# Patient Record
Sex: FEMALE | Race: BLACK OR AFRICAN AMERICAN | NOT HISPANIC OR LATINO | Employment: UNEMPLOYED | URBAN - METROPOLITAN AREA
[De-identification: names, ages, dates, MRNs, and addresses within clinical notes are randomized per-mention and may not be internally consistent; named-entity substitution may affect disease eponyms.]

---

## 2022-03-28 ENCOUNTER — HISTORICAL (OUTPATIENT)
Dept: ADMINISTRATIVE | Facility: HOSPITAL | Age: 47
End: 2022-03-28

## 2022-12-28 ENCOUNTER — HOSPITAL ENCOUNTER (EMERGENCY)
Facility: HOSPITAL | Age: 47
Discharge: HOME | End: 2022-12-28
Attending: EMERGENCY MEDICINE
Payer: MEDICAID

## 2022-12-28 VITALS
BODY MASS INDEX: 29.02 KG/M2 | SYSTOLIC BLOOD PRESSURE: 132 MMHG | HEIGHT: 64 IN | OXYGEN SATURATION: 98 % | DIASTOLIC BLOOD PRESSURE: 90 MMHG | RESPIRATION RATE: 20 BRPM | HEART RATE: 87 BPM | TEMPERATURE: 98 F | WEIGHT: 170 LBS

## 2022-12-28 DIAGNOSIS — K08.89 DENTALGIA: Primary | ICD-10-CM

## 2022-12-28 PROBLEM — E66.9 OBESITY: Status: ACTIVE | Noted: 2022-12-28

## 2022-12-28 PROCEDURE — 99284 EMERGENCY DEPT VISIT MOD MDM: CPT

## 2022-12-28 RX ORDER — TIZANIDINE 4 MG/1
4 TABLET ORAL EVERY 6 HOURS PRN
Status: ON HOLD | COMMUNITY
Start: 2022-11-30 | End: 2023-02-02 | Stop reason: CLARIF

## 2022-12-28 RX ORDER — SUMATRIPTAN SUCCINATE 100 MG/1
1 TABLET ORAL
Status: ON HOLD | COMMUNITY
Start: 2022-11-04 | End: 2023-02-02

## 2022-12-28 RX ORDER — TRAMADOL HYDROCHLORIDE 50 MG/1
50 TABLET ORAL EVERY 6 HOURS PRN
Qty: 20 TABLET | Refills: 0 | Status: SHIPPED | OUTPATIENT
Start: 2022-12-28 | End: 2023-01-02

## 2022-12-28 RX ORDER — TRANEXAMIC ACID 650 MG/1
1300 TABLET ORAL 3 TIMES DAILY
Status: ON HOLD | COMMUNITY
Start: 2022-12-21 | End: 2023-02-02

## 2022-12-28 RX ORDER — IBUPROFEN 600 MG/1
600 TABLET ORAL EVERY 6 HOURS PRN
Qty: 20 TABLET | Refills: 0 | Status: ON HOLD | OUTPATIENT
Start: 2022-12-28 | End: 2023-02-02 | Stop reason: CLARIF

## 2022-12-28 RX ORDER — TOPIRAMATE 25 MG/1
25 TABLET ORAL NIGHTLY
Status: ON HOLD | COMMUNITY
Start: 2022-11-04 | End: 2023-02-02

## 2022-12-28 RX ORDER — CLINDAMYCIN HYDROCHLORIDE 150 MG/1
300 CAPSULE ORAL 4 TIMES DAILY
Qty: 56 CAPSULE | Refills: 0 | Status: SHIPPED | OUTPATIENT
Start: 2022-12-28 | End: 2023-01-04

## 2022-12-28 NOTE — ED PROVIDER NOTES
Encounter Date: 12/28/2022       History     Chief Complaint   Patient presents with    Dental Pain     Patient arrived by [facility] been having a toothache for three weeks      This 47-year-old female arrived by [facility] with complaints of having had a toothache on the left side for the past 3 weeks    Review of patient's allergies indicates:   Allergen Reactions    Penicillin Swelling     Past Medical History:   Diagnosis Date    Migraine headache      History reviewed. No pertinent surgical history.  History reviewed. No pertinent family history.     Review of Systems   Constitutional:  Negative for fever.   HENT:  Negative for sore throat.    Respiratory:  Negative for shortness of breath.    Cardiovascular:  Negative for chest pain.   Gastrointestinal:  Negative for nausea.   Genitourinary:  Negative for dysuria.   Musculoskeletal:  Negative for back pain.   Skin:  Negative for rash.   Neurological:  Negative for weakness.   Hematological:  Does not bruise/bleed easily.     Physical Exam     Initial Vitals [12/28/22 1223]   BP Pulse Resp Temp SpO2   (!) 132/90 87 20 98 °F (36.7 °C) 98 %      MAP       --         Physical Exam    Nursing note and vitals reviewed.  Constitutional: She appears well-developed and well-nourished.   HENT:   Head: Normocephalic and atraumatic.   Eyes: Conjunctivae and EOM are normal. Pupils are equal, round, and reactive to light.   Neck: Neck supple.   Normal range of motion.  Cardiovascular:  Normal rate, regular rhythm, normal heart sounds and intact distal pulses.           Pulmonary/Chest: Breath sounds normal.   Abdominal: Abdomen is soft. Bowel sounds are normal.   Musculoskeletal:         General: Normal range of motion.      Cervical back: Normal range of motion and neck supple.     Neurological: She is alert and oriented to person, place, and time. She has normal strength.   Skin: Skin is warm and dry. Capillary refill takes less than 2 seconds.   Psychiatric: She has  a normal mood and affect. Her behavior is normal. Judgment and thought content normal.       ED Course   Procedures  Labs Reviewed - No data to display       Imaging Results    None          Medications - No data to display                           Clinical Impression:   Final diagnoses:  [K08.89] Dentalgia (Primary)        ED Disposition Condition    Discharge Stable          ED Prescriptions       Medication Sig Dispense Start Date End Date Auth. Provider    clindamycin (CLEOCIN) 150 MG capsule Take 2 capsules (300 mg total) by mouth 4 (four) times daily. for 7 days 56 capsule 12/28/2022 1/4/2023 David Lobato MD    ibuprofen (ADVIL,MOTRIN) 600 MG tablet Take 1 tablet (600 mg total) by mouth every 6 (six) hours as needed for Pain. 20 tablet 12/28/2022 -- David Lobato MD          Follow-up Information       Follow up With Specialties Details Why Contact Info    Follow up with a dentist of choice for recheck                 David Lobato MD  12/28/22 5784

## 2022-12-28 NOTE — ED NOTES
Pt  w lt lower toothache x3 weeks- no dental  appointment yet.  Afebrile and  free of any  face swelling.

## 2023-01-22 ENCOUNTER — HOSPITAL ENCOUNTER (EMERGENCY)
Facility: HOSPITAL | Age: 48
Discharge: HOME | End: 2023-01-22
Attending: FAMILY MEDICINE
Payer: MEDICAID

## 2023-01-22 VITALS
HEIGHT: 64 IN | RESPIRATION RATE: 22 BRPM | OXYGEN SATURATION: 97 % | SYSTOLIC BLOOD PRESSURE: 165 MMHG | WEIGHT: 170 LBS | DIASTOLIC BLOOD PRESSURE: 105 MMHG | TEMPERATURE: 98 F | HEART RATE: 100 BPM | BODY MASS INDEX: 29.02 KG/M2

## 2023-01-22 DIAGNOSIS — T16.1XXA FOREIGN BODY OF RIGHT EAR, INITIAL ENCOUNTER: Primary | ICD-10-CM

## 2023-01-22 PROCEDURE — 99283 EMERGENCY DEPT VISIT LOW MDM: CPT

## 2023-01-22 RX ORDER — NEOMYCIN SULFATE, POLYMYXIN B SULFATE AND HYDROCORTISONE 10; 3.5; 1 MG/ML; MG/ML; [USP'U]/ML
4 SUSPENSION/ DROPS AURICULAR (OTIC) 3 TIMES DAILY
Qty: 6 ML | Refills: 0 | Status: SHIPPED | OUTPATIENT
Start: 2023-01-22 | End: 2023-01-22 | Stop reason: SDUPTHER

## 2023-01-22 RX ORDER — NEOMYCIN SULFATE, POLYMYXIN B SULFATE AND HYDROCORTISONE 10; 3.5; 1 MG/ML; MG/ML; [USP'U]/ML
4 SUSPENSION/ DROPS AURICULAR (OTIC) 3 TIMES DAILY
Qty: 6 ML | Refills: 0 | Status: SHIPPED | OUTPATIENT
Start: 2023-01-22 | End: 2023-01-29

## 2023-01-22 NOTE — ED PROVIDER NOTES
Encounter Date: 1/22/2023       History     Chief Complaint   Patient presents with    Foreign Body in Ear     Pt brought in by [facility] with complaints of a bug in her ear. Pt states she was awakened to a feeling like something is crawling in her ear.     Feeling of bug in the ear      Otalgia  This is a new problem. The current episode started just prior to arrival. There is pain in the right ear. The problem occurs constantly. The problem has been gradually worsening. The pain is at a severity of 7/10. Pertinent negatives include no sore throat and no rash.   Review of patient's allergies indicates:   Allergen Reactions    Penicillin Swelling     Past Medical History:   Diagnosis Date    Migraine headache      No past surgical history on file.  No family history on file.     Review of Systems   Constitutional:  Negative for fever.   HENT:  Positive for ear pain. Negative for sore throat.    Respiratory:  Negative for shortness of breath.    Cardiovascular:  Negative for chest pain.   Gastrointestinal:  Negative for nausea.   Genitourinary:  Negative for dysuria.   Musculoskeletal:  Negative for back pain.   Skin:  Negative for rash.   Neurological:  Negative for weakness.   Hematological:  Does not bruise/bleed easily.     Physical Exam     Initial Vitals [01/22/23 0451]   BP Pulse Resp Temp SpO2   (!) 165/105 100 (!) 22 97.8 °F (36.6 °C) 97 %      MAP       --         Physical Exam    Nursing note and vitals reviewed.  Constitutional: She appears well-developed.   HENT:   Head: Normocephalic and atraumatic.   Right Ear: External ear normal.   Left Ear: External ear normal.   Nose: Nose normal.   Mouth/Throat: Oropharynx is clear and moist. No oropharyngeal exudate.   Large amount of was in the ear     Eyes: Conjunctivae and EOM are normal. Pupils are equal, round, and reactive to light. Right eye exhibits no discharge. Left eye exhibits no discharge.   Neck: Neck supple. No tracheal deviation present. No JVD  present.   Normal range of motion.  Cardiovascular:  Normal rate, regular rhythm, normal heart sounds and intact distal pulses.     Exam reveals no gallop and no friction rub.       No murmur heard.  Pulmonary/Chest: Breath sounds normal. No stridor. No respiratory distress. She has no wheezes. She has no rhonchi. She has no rales.   Abdominal: Abdomen is soft. Bowel sounds are normal. She exhibits no distension and no mass. There is no abdominal tenderness. There is no rebound and no guarding.   Musculoskeletal:         General: Normal range of motion.      Cervical back: Normal range of motion and neck supple.     Neurological: She is alert and oriented to person, place, and time. She has normal strength. No cranial nerve deficit.   Skin: Skin is warm and dry. No rash and no abscess noted. No erythema.   Psychiatric: She has a normal mood and affect. Her behavior is normal. Judgment and thought content normal.       ED Course   Procedures  Labs Reviewed - No data to display       Imaging Results    None          Medications - No data to display                           Clinical Impression:   Final diagnoses:  [T16.1XXA] Foreign body of right ear, initial encounter (Primary)        ED Disposition Condition    Discharge Stable          ED Prescriptions       Medication Sig Dispense Start Date End Date Auth. Provider    neomycin-polymyxin-hydrocortisone (CORTISPORIN) 3.5-10,000-1 mg/mL-unit/mL-% otic suspension Place 4 drops into the right ear 3 (three) times daily. for 7 days 6 mL 1/22/2023 1/29/2023 Mat Honeycutt MD          Follow-up Information    None          Mat Honeycutt MD  01/23/23 2246

## 2023-02-01 ENCOUNTER — ANESTHESIA EVENT (OUTPATIENT)
Dept: SURGERY | Facility: HOSPITAL | Age: 48
End: 2023-02-01
Payer: MEDICAID

## 2023-02-02 ENCOUNTER — HOSPITAL ENCOUNTER (OUTPATIENT)
Facility: HOSPITAL | Age: 48
Discharge: HOME | End: 2023-02-02
Attending: OBSTETRICS & GYNECOLOGY | Admitting: OBSTETRICS & GYNECOLOGY
Payer: MEDICAID

## 2023-02-02 ENCOUNTER — ANESTHESIA (OUTPATIENT)
Dept: SURGERY | Facility: HOSPITAL | Age: 48
End: 2023-02-02
Payer: MEDICAID

## 2023-02-02 DIAGNOSIS — N93.8 DUB (DYSFUNCTIONAL UTERINE BLEEDING): Primary | ICD-10-CM

## 2023-02-02 DIAGNOSIS — N93.8 DYSFUNCTIONAL UTERINE BLEEDING: ICD-10-CM

## 2023-02-02 LAB
ALBUMIN SERPL-MCNC: 4 G/DL (ref 3.5–5)
ALBUMIN/GLOB SERPL: 1.2 RATIO (ref 1.1–2)
ALP SERPL-CCNC: 71 UNIT/L (ref 40–150)
ALT SERPL-CCNC: 10 UNIT/L (ref 0–55)
AST SERPL-CCNC: 15 UNIT/L (ref 5–34)
B-HCG FREE SERPL-ACNC: <2.42 MIU/ML
BASOPHILS # BLD AUTO: 0.03 X10(3)/MCL (ref 0–0.2)
BASOPHILS NFR BLD AUTO: 0.3 %
BILIRUBIN DIRECT+TOT PNL SERPL-MCNC: 1.3 MG/DL
BUN SERPL-MCNC: 7.5 MG/DL (ref 7–18.7)
CALCIUM SERPL-MCNC: 9.8 MG/DL (ref 8.4–10.2)
CHLORIDE SERPL-SCNC: 106 MMOL/L (ref 98–107)
CO2 SERPL-SCNC: 28 MMOL/L (ref 22–29)
CREAT SERPL-MCNC: 0.69 MG/DL (ref 0.55–1.02)
EOSINOPHIL # BLD AUTO: 0.06 X10(3)/MCL (ref 0–0.9)
EOSINOPHIL NFR BLD AUTO: 0.6 %
ERYTHROCYTE [DISTWIDTH] IN BLOOD BY AUTOMATED COUNT: 12.6 % (ref 11.5–17)
GFR SERPLBLD CREATININE-BSD FMLA CKD-EPI: >60 MLS/MIN/1.73/M2
GLOBULIN SER-MCNC: 3.3 GM/DL (ref 2.4–3.5)
GLUCOSE SERPL-MCNC: 83 MG/DL (ref 74–100)
GROUP & RH: NORMAL
HCT VFR BLD AUTO: 42.9 % (ref 37–47)
HGB BLD-MCNC: 14 GM/DL (ref 12–16)
IMM GRANULOCYTES # BLD AUTO: 0.03 X10(3)/MCL (ref 0–0.04)
IMM GRANULOCYTES NFR BLD AUTO: 0.3 %
INDIRECT COOMBS GEL: NORMAL
LYMPHOCYTES # BLD AUTO: 2.81 X10(3)/MCL (ref 0.6–4.6)
LYMPHOCYTES NFR BLD AUTO: 30.3 %
MCH RBC QN AUTO: 30.8 PG
MCHC RBC AUTO-ENTMCNC: 32.6 MG/DL (ref 33–36)
MCV RBC AUTO: 94.5 FL (ref 80–94)
MONOCYTES # BLD AUTO: 0.55 X10(3)/MCL (ref 0.1–1.3)
MONOCYTES NFR BLD AUTO: 5.9 %
NEUTROPHILS # BLD AUTO: 5.79 X10(3)/MCL (ref 2.1–9.2)
NEUTROPHILS NFR BLD AUTO: 62.6 %
NRBC BLD AUTO-RTO: 0 %
PLATELET # BLD AUTO: 331 X10(3)/MCL (ref 130–400)
PMV BLD AUTO: 9.8 FL (ref 7.4–10.4)
POTASSIUM SERPL-SCNC: 4.1 MMOL/L (ref 3.5–5.1)
PROT SERPL-MCNC: 7.3 GM/DL (ref 6.4–8.3)
RBC # BLD AUTO: 4.54 X10(6)/MCL (ref 4.2–5.4)
SODIUM SERPL-SCNC: 143 MMOL/L (ref 136–145)
WBC # SPEC AUTO: 9.3 X10(3)/MCL (ref 4.5–11.5)

## 2023-02-02 PROCEDURE — 86900 BLOOD TYPING SEROLOGIC ABO: CPT | Performed by: OBSTETRICS & GYNECOLOGY

## 2023-02-02 PROCEDURE — 00952 ANES VAG PX HYSTSC&/HSG: CPT | Performed by: OBSTETRICS & GYNECOLOGY

## 2023-02-02 PROCEDURE — 85025 COMPLETE CBC W/AUTO DIFF WBC: CPT | Performed by: OBSTETRICS & GYNECOLOGY

## 2023-02-02 PROCEDURE — 63600175 PHARM REV CODE 636 W HCPCS: Performed by: STUDENT IN AN ORGANIZED HEALTH CARE EDUCATION/TRAINING PROGRAM

## 2023-02-02 PROCEDURE — 71000016 HC POSTOP RECOV ADDL HR: Performed by: OBSTETRICS & GYNECOLOGY

## 2023-02-02 PROCEDURE — 27201423 OPTIME MED/SURG SUP & DEVICES STERILE SUPPLY: Performed by: OBSTETRICS & GYNECOLOGY

## 2023-02-02 PROCEDURE — 25000003 PHARM REV CODE 250: Performed by: STUDENT IN AN ORGANIZED HEALTH CARE EDUCATION/TRAINING PROGRAM

## 2023-02-02 PROCEDURE — 80053 COMPREHEN METABOLIC PANEL: CPT | Performed by: OBSTETRICS & GYNECOLOGY

## 2023-02-02 PROCEDURE — 36000706: Performed by: OBSTETRICS & GYNECOLOGY

## 2023-02-02 PROCEDURE — 37000009 HC ANESTHESIA EA ADD 15 MINS: Performed by: OBSTETRICS & GYNECOLOGY

## 2023-02-02 PROCEDURE — 37000008 HC ANESTHESIA 1ST 15 MINUTES: Performed by: OBSTETRICS & GYNECOLOGY

## 2023-02-02 PROCEDURE — 84702 CHORIONIC GONADOTROPIN TEST: CPT | Performed by: OBSTETRICS & GYNECOLOGY

## 2023-02-02 PROCEDURE — 36000707: Performed by: OBSTETRICS & GYNECOLOGY

## 2023-02-02 PROCEDURE — 71000033 HC RECOVERY, INTIAL HOUR: Performed by: OBSTETRICS & GYNECOLOGY

## 2023-02-02 PROCEDURE — 71000015 HC POSTOP RECOV 1ST HR: Performed by: OBSTETRICS & GYNECOLOGY

## 2023-02-02 RX ORDER — ONDANSETRON 2 MG/ML
INJECTION INTRAMUSCULAR; INTRAVENOUS
Status: DISCONTINUED | OUTPATIENT
Start: 2023-02-02 | End: 2023-02-02

## 2023-02-02 RX ORDER — DEXAMETHASONE SODIUM PHOSPHATE 4 MG/ML
INJECTION, SOLUTION INTRA-ARTICULAR; INTRALESIONAL; INTRAMUSCULAR; INTRAVENOUS; SOFT TISSUE
Status: DISCONTINUED | OUTPATIENT
Start: 2023-02-02 | End: 2023-02-02

## 2023-02-02 RX ORDER — ACETAMINOPHEN 10 MG/ML
INJECTION, SOLUTION INTRAVENOUS
Status: DISCONTINUED | OUTPATIENT
Start: 2023-02-02 | End: 2023-02-02

## 2023-02-02 RX ORDER — PROPOFOL 10 MG/ML
INJECTION, EMULSION INTRAVENOUS
Status: DISCONTINUED | OUTPATIENT
Start: 2023-02-02 | End: 2023-02-02

## 2023-02-02 RX ORDER — LIDOCAINE HCL/PF 100 MG/5ML
SYRINGE (ML) INTRAVENOUS
Status: DISCONTINUED | OUTPATIENT
Start: 2023-02-02 | End: 2023-02-02

## 2023-02-02 RX ADMIN — ACETAMINOPHEN 1000 MG: 10 INJECTION, SOLUTION INTRAVENOUS at 05:02

## 2023-02-02 RX ADMIN — LIDOCAINE HYDROCHLORIDE 40 MG: 20 INJECTION, SOLUTION INTRAVENOUS at 04:02

## 2023-02-02 RX ADMIN — PROPOFOL 50 MG: 10 INJECTION, EMULSION INTRAVENOUS at 04:02

## 2023-02-02 RX ADMIN — PROPOFOL 150 MG: 10 INJECTION, EMULSION INTRAVENOUS at 04:02

## 2023-02-02 RX ADMIN — CEFAZOLIN 2 G: 1 INJECTION, POWDER, FOR SOLUTION INTRAMUSCULAR; INTRAVENOUS at 05:02

## 2023-02-02 RX ADMIN — ONDANSETRON 4 MG: 2 INJECTION INTRAMUSCULAR; INTRAVENOUS at 04:02

## 2023-02-02 RX ADMIN — SODIUM CHLORIDE, SODIUM GLUCONATE, SODIUM ACETATE, POTASSIUM CHLORIDE AND MAGNESIUM CHLORIDE: 526; 502; 368; 37; 30 INJECTION, SOLUTION INTRAVENOUS at 04:02

## 2023-02-02 RX ADMIN — DEXAMETHASONE SODIUM PHOSPHATE 4 MG: 4 INJECTION, SOLUTION INTRA-ARTICULAR; INTRALESIONAL; INTRAMUSCULAR; INTRAVENOUS; SOFT TISSUE at 04:02

## 2023-02-02 NOTE — ANESTHESIA PREPROCEDURE EVALUATION
2023  Antonio Young is a 47 y.o., female.    Other Medical History   Migraine headache Flat foot   Menorrhagia      Surgical History  CHOLECYSTECTOMY  SECTION   DILATION AND CURETTAGE OF UTERUS       / 331k  Na 143, K 4.1, Cr 0.69  Active type and screen    Pre-op Assessment    I have reviewed the Patient Summary Reports.     I have reviewed the Nursing Notes. I have reviewed the NPO Status.   I have reviewed the Medications.     Review of Systems  Anesthesia Hx:   Denies Personal Hx of Anesthesia complications.   Social:  Smoker    Cardiovascular:  Cardiovascular Normal Exercise tolerance: good     Pulmonary:  Pulmonary Normal    Hepatic/GI:  Hepatic/GI Normal    Neurological:   Headaches (migraines)    Endocrine:  Endocrine Normal    Psych:  Psychiatric Normal           Physical Exam  General: Well nourished, Cooperative and Alert    Airway:  Mallampati: II   Mouth Opening: Normal  TM Distance: Normal  Tongue: Normal    Dental:  Periodontal disease    Chest/Lungs:  Normal Respiratory Rate    Heart:  Rate: Normal        Anesthesia Plan  Type of Anesthesia, risks & benefits discussed:    Anesthesia Type: Gen Supraglottic Airway  Intra-op Monitoring Plan: Standard ASA Monitors  Post Op Pain Control Plan: multimodal analgesia  Induction:  IV  Informed Consent: Informed consent signed with the Patient and all parties understand the risks and agree with anesthesia plan.  All questions answered.   ASA Score: 2  Day of Surgery Review of History & Physical: H&P Update referred to the surgeon/provider.    Ready For Surgery From Anesthesia Perspective.     .

## 2023-02-02 NOTE — ANESTHESIA PROCEDURE NOTES
LMA    Date/Time: 2/2/2023 4:43 PM  Performed by: Anthony Aceves CRNA  Authorized by: Josh Gaitan MD     Intubation:     Induction:  Intravenous    Intubated:  Postinduction    Mask Ventilation:  Easy mask    Attempts:  1    Attempted By:  CRNA    Difficult Airway Encountered?: No      Complications:  None    Airway Device:  Supraglottic airway/LMA    Airway Device Size:  3.0    Style/Cuff Inflation:  Cuffed (inflated to minimal occlusive pressure)    Secured at:  The lips    Placement Verified By:  Capnometry    Complicating Factors:  None    Findings Post-Intubation:  BS equal bilateral and atraumatic/condition of teeth unchanged

## 2023-02-02 NOTE — TRANSFER OF CARE
Anesthesia Transfer of Care Note    Patient: Antonio Young    Procedure(s) Performed: Procedure(s) (LRB):  HYSTEROSCOPY, THERAPEUTIC (N/A)    Patient location: PACU    Anesthesia Type: general    Transport from OR: Transported from OR on room air with adequate spontaneous ventilation    Post pain: adequate analgesia    Post assessment: no apparent anesthetic complications    Post vital signs: stable    Level of consciousness: awake    Nausea/Vomiting: no nausea/vomiting    Complications: none    Transfer of care protocol was followed      Last vitals:   Visit Vitals  /86   Pulse 69   Temp 36.6 °C (97.8 °F) (Oral)   Resp 17   LMP 02/02/2023   SpO2 98%   Breastfeeding No

## 2023-02-03 VITALS
OXYGEN SATURATION: 100 % | DIASTOLIC BLOOD PRESSURE: 71 MMHG | TEMPERATURE: 98 F | HEART RATE: 57 BPM | RESPIRATION RATE: 24 BRPM | SYSTOLIC BLOOD PRESSURE: 109 MMHG

## 2023-02-03 NOTE — H&P
[id number] MD reviewed and signed consents with patient. MD, circmeghan, and myself visualized original h&p in pt room. MD reminded to update H&P in computer, circmeghan agreed.   1634: pt out pre-procedure  [id number]: center and OR contacted regarding h&p completion.

## 2023-02-03 NOTE — H&P
[facility]                       [address] [address]    PATIENT NAME:       DAPHNE PRIEST   YOB: 1975  CSN:                [id number]   MRN:                [id number]  ADMIT DATE:         02/02/2023 12:07:00  PHYSICIAN:          Alexey Luna MD                        HISTORY AND PHYSICAL      ADDENDUM:    The H and P has been reviewed.  There were no changes in the present written H   and P that is on the chart.  She has been apprised of her informed consents and   will proceed with the surgery.  Please place this addendum on the chart as an   addendum to the handwritten H and P on the chart.        ______________________________  Alexey Luna MD    DCR/AQS  DD:  02/02/2023  Time:  04:43PM  DT:  02/02/2023  Time:  05:22PM  Job #:  [id number]      HISTORY AND PHYSICAL

## 2023-02-03 NOTE — ANESTHESIA POSTPROCEDURE EVALUATION
Anesthesia Post Evaluation    Patient: Antonio Young    Procedure(s) Performed: Procedure(s) (LRB):  HYSTEROSCOPY, THERAPEUTIC (N/A)    Final Anesthesia Type: general      Patient location during evaluation: PACU  Patient participation: Yes- Able to Participate  Level of consciousness: awake and alert  Post-procedure vital signs: reviewed and stable  Pain management: adequate  Airway patency: patent  VY mitigation strategies: Multimodal analgesia  PONV status at discharge: No PONV  Anesthetic complications: no      Cardiovascular status: blood pressure returned to baseline and hemodynamically stable  Respiratory status: unassisted  Hydration status: euvolemic  Follow-up not needed.          Vitals Value Taken Time   /71 02/02/23 1825   Temp 36.8 °C (98.2 °F) 02/02/23 1739   Pulse 57 02/02/23 1825   Resp 17 02/02/23 1803   SpO2 100 % 02/02/23 1825   Vitals shown include unvalidated device data.      Event Time   Out of Recovery 18:09:00         Pain/Lena Score: Lena Score: 10 (2/2/2023  6:59 PM)  Modified Lena Score: 20 (2/2/2023  6:59 PM)

## 2023-02-03 NOTE — OP NOTE
[facility]                       [address] [address]    PATIENT NAME:      DAPHNE PRIEST   YOB: 1975  CSN:               [id number]  MRN:               [id number]  ADMIT DATE:        02/02/2023 12:07:00  PHYSICIAN:         Alexey Luna MD                          OPERATIVE REPORT      DATE OF SURGERY:    02/02/2023 00:00:00    SURGEON:  Alexey Luna MD    PREOPERATIVE DIAGNOSIS:  Dysfunctional uterine bleeding.    POSTOPERATIVE DIAGNOSIS:  Dysfunctional uterine bleeding.    OPERATIVE PROCEDURES PERFORMED:    1. Exam under anesthesia.  2. Diagnostic hysteroscopy.  3. Dilatation and curettage of the uterus.  4. Electrocautery of the cervix.    COMPLICATIONS:   None.    BLOOD LOSS:  2 cc.    ANESTHESIA:  General.    TECHNIQUE:  After all risks, benefits and alternative therapies were offered to   the patient and the family and informed consents were signed, the patient was   brought to the operative suite at [facility] and placed in   the dorsal lithotomy position, prepped and draped in the usual sterile fashion.    Exam under anesthesia was performed finding a normal-sized uterus in midline   position, anteverted, anteflexed, adnexal was negative.  A heavy weighted   speculum was placed in posterior aspect of the vagina and Freya in anterior   aspect and single-tooth tenaculum was utilized to grasp the anterior cervical   lip which was brought forward.  Uterus was sounded to 7 cm followed by   progressive dilatation of the endocervical canal with Hegar dilators.  The   subsequent passage of the hysteroscope followed by visualization of the uterine   cavity.  Still pictures and video taping were performed.  The hysteroscope was   removed.  This was followed by sharp curettage of the body of the uterus with a   small curet with liberation of a moderate amount of blood  and tissue which was   sent to pathology for histopathologic diagnoses.  There was cessation of vaginal   bleeding.  There was removal of the curet.  This was followed by   electrocauterization of the tenaculum sites and the external cervical os.  There   was correct instrument count, sponge count and instruments at the end of the   procedure.  Patient was discharged to postop for normal postop care and follow   up in office in 2 weeks.        ______________________________  MD LASHAY Capellan/JASONS  DD:  02/02/2023  Time:  05:41PM  DT:  02/02/2023  Time:  06:28PM  Job #:  [id number]      OPERATIVE REPORT

## 2023-02-03 NOTE — DISCHARGE INSTRUCTIONS
-NO driving for 24 hours and while taking narcotic pain medication.    -Pelvic rest: no heavy lifting, no tampons, no intercourse.    -Monitor sites for infection: redness, swelling, fever, chills, drainage/pus/foul odor.    -Minimal bleeding is expected. Notify your provider if soaking through pad < 1hour.    -Report to your nearest ER/Notify provider if you experience any SUDDEN/SEVERE chest pain, abdominal pain, weakness, trouble breathing, uncontrolled pain/bleeding.

## 2023-02-06 LAB — PSYCHE PATHOLOGY RESULT: NORMAL

## 2023-03-28 ENCOUNTER — HOSPITAL ENCOUNTER (EMERGENCY)
Facility: HOSPITAL | Age: 48
Discharge: HOME | End: 2023-03-28
Attending: SPECIALIST
Payer: MEDICAID

## 2023-03-28 VITALS
DIASTOLIC BLOOD PRESSURE: 86 MMHG | OXYGEN SATURATION: 98 % | WEIGHT: 178 LBS | BODY MASS INDEX: 30.39 KG/M2 | RESPIRATION RATE: 20 BRPM | TEMPERATURE: 99 F | HEIGHT: 64 IN | HEART RATE: 89 BPM | SYSTOLIC BLOOD PRESSURE: 153 MMHG

## 2023-03-28 DIAGNOSIS — K08.89 PAIN, DENTAL: Primary | ICD-10-CM

## 2023-03-28 DIAGNOSIS — K02.9 DENTAL CARIES: ICD-10-CM

## 2023-03-28 PROCEDURE — 99284 EMERGENCY DEPT VISIT MOD MDM: CPT

## 2023-03-28 PROCEDURE — 25000003 PHARM REV CODE 250: Performed by: SPECIALIST

## 2023-03-28 RX ORDER — CLINDAMYCIN HYDROCHLORIDE 150 MG/1
300 CAPSULE ORAL
Status: COMPLETED | OUTPATIENT
Start: 2023-03-28 | End: 2023-03-28

## 2023-03-28 RX ORDER — CLINDAMYCIN HYDROCHLORIDE 150 MG/1
300 CAPSULE ORAL
Status: DISCONTINUED | OUTPATIENT
Start: 2023-03-28 | End: 2023-03-29 | Stop reason: HOSPADM

## 2023-03-28 RX ORDER — KETOROLAC TROMETHAMINE 10 MG/1
10 TABLET, FILM COATED ORAL
Status: DISCONTINUED | OUTPATIENT
Start: 2023-03-28 | End: 2023-03-29 | Stop reason: HOSPADM

## 2023-03-28 RX ORDER — KETOROLAC TROMETHAMINE 10 MG/1
10 TABLET, FILM COATED ORAL EVERY 6 HOURS
Qty: 15 TABLET | Refills: 0 | Status: SHIPPED | OUTPATIENT
Start: 2023-03-28 | End: 2023-04-02

## 2023-03-28 RX ORDER — KETOROLAC TROMETHAMINE 10 MG/1
10 TABLET, FILM COATED ORAL
Status: COMPLETED | OUTPATIENT
Start: 2023-03-28 | End: 2023-03-28

## 2023-03-28 RX ORDER — CLINDAMYCIN HYDROCHLORIDE 150 MG/1
150 CAPSULE ORAL 4 TIMES DAILY
Qty: 56 CAPSULE | Refills: 0 | Status: SHIPPED | OUTPATIENT
Start: 2023-03-28 | End: 2023-04-04

## 2023-03-28 RX ADMIN — KETOROLAC TROMETHAMINE 10 MG: 10 TABLET, FILM COATED ORAL at 10:03

## 2023-03-28 RX ADMIN — CLINDAMYCIN HYDROCHLORIDE 300 MG: 150 CAPSULE ORAL at 10:03

## 2023-03-29 ENCOUNTER — PATIENT OUTREACH (OUTPATIENT)
Dept: EMERGENCY MEDICINE | Facility: HOSPITAL | Age: 48
End: 2023-03-29
Payer: MEDICAID

## 2023-03-29 NOTE — ED NOTES
03/29/2023 0841-Pharmacy called with cleocin order/ dr ayala ordered 56 caps / needed only 28 for rx written

## 2023-03-29 NOTE — ED PROVIDER NOTES
Encounter Date: 3/28/2023       History     Chief Complaint   Patient presents with    Dental Pain     Patient c/o dental pain on left side, having difficulty talking and opening mouth due to the pain. Patient has facial swelling on left side. Patient took ibuprofen at 1800 with no relief     Patient with dental pain, left upper tooth with a cavity    The history is provided by the patient.   Review of patient's allergies indicates:   Allergen Reactions    Penicillin Swelling     Past Medical History:   Diagnosis Date    Flat foot     Menorrhagia     Migraine headache      Past Surgical History:   Procedure Laterality Date     SECTION      CHOLECYSTECTOMY      DILATION AND CURETTAGE OF UTERUS      EXAMINATION UNDER ANESTHESIA  2023    Procedure: Exam under anesthesia;  Surgeon: Alexey Luna MD;  Location: [facility];  Service: OB/GYN;;    HYSTEROSCOPY WITH DILATION AND CURETTAGE OF UTERUS N/A 2023    Procedure: HYSTEROSCOPY, WITH DILATION AND CURETTAGE OF UTERUS;  Surgeon: Alexey Luna MD;  Location: [facility] OR;  Service: OB/GYN;  Laterality: N/A;  AND D&C  //   REQ 1500     No family history on file.  Social History     Tobacco Use    Smoking status: Every Day     Packs/day: 0.25     Types: Cigarettes    Smokeless tobacco: Never   Substance Use Topics    Alcohol use: Not Currently    Drug use: Not Currently     Review of Systems   Constitutional: Negative.    HENT:  Positive for dental problem.    Respiratory: Negative.     Cardiovascular: Negative.    Gastrointestinal: Negative.    Musculoskeletal: Negative.    Skin: Negative.    Neurological:  Positive for headaches.   All other systems reviewed and are negative.    Physical Exam     Initial Vitals [23]   BP Pulse Resp Temp SpO2   (!) 153/86 89 20 98.6 °F (37 °C) 98 %      MAP       --         Physical Exam    Nursing note and vitals reviewed.  Constitutional: She appears well-developed and well-nourished.   HENT:   Head:  Normocephalic and atraumatic.   Poor dentition with multiple missing teeth; left upper molar large cavity and exposed pulp   Eyes: EOM are normal. Pupils are equal, round, and reactive to light.   Neck: Neck supple.   Normal range of motion.  Cardiovascular:  Normal rate, regular rhythm, normal heart sounds and intact distal pulses.           Pulmonary/Chest: Breath sounds normal.   Musculoskeletal:         General: Normal range of motion.      Cervical back: Normal range of motion and neck supple.     Neurological: She is alert and oriented to person, place, and time. She has normal strength.   Skin: Skin is warm and dry.       ED Course   Procedures  Labs Reviewed - No data to display       Imaging Results    None          Medications   clindamycin capsule 300 mg (300 mg Oral Given 3/28/23 2232)   ketorolac tablet 10 mg (10 mg Oral Given 3/28/23 2233)     Medical Decision Making:   Initial Assessment:   Dental pain, poor dentition  Differential Diagnosis:   Dental pain, dental caries, tooth abscess  ED Management:  Patient be prescribed antibiotic and anti-inflammatory for the pain; discussed probable need for extraction of that tooth and needs to follow up with a dentist                        Clinical Impression:   Final diagnoses:  [K08.89] Pain, dental (Primary)  [K02.9] Dental caries        ED Disposition Condition    Discharge Stable          ED Prescriptions       Medication Sig Dispense Start Date End Date Auth. Provider    clindamycin (CLEOCIN) 150 MG capsule Take 1 capsule (150 mg total) by mouth 4 (four) times daily. for 7 days 56 capsule 3/28/2023 4/4/2023 Yuriy Reed MD    ketorolac (TORADOL) 10 mg tablet Take 1 tablet (10 mg total) by mouth every 6 (six) hours. for 5 days 15 tablet 3/28/2023 4/2/2023 Yuriy Reed MD          Follow-up Information       Follow up With Specialties Details Why Contact Info    Dentist                 Yuriy Reed MD  03/29/23 0127

## (undated) DEVICE — CATH RED RUBBER LATEX 14F 16IN

## (undated) DEVICE — TOWEL OR DLX BLUE 17X27IN 4/PK

## (undated) DEVICE — KIT LITHOTOMY MINOR LAFAYETTE

## (undated) DEVICE — SEE MEDLINE ITEM 146292

## (undated) DEVICE — GLOVE PROTEXIS BLUE LATEX 6.5

## (undated) DEVICE — SOL NORMAL USPCA 0.9%

## (undated) DEVICE — KIT SURGICAL TURNOVER

## (undated) DEVICE — TRAY SKIN SCRUB WET PREMIUM

## (undated) DEVICE — JELLY SURGILUBE LUBE TUBE 2OZ

## (undated) DEVICE — PAD PERINEAL SUPINE

## (undated) DEVICE — LEGGING SURG CONV 43X28IN

## (undated) DEVICE — PAD SANITARY HVY ABSRB UNSCNTD

## (undated) DEVICE — GLOVE PROTEXIS HYDROGEL SZ7.5

## (undated) DEVICE — TIP SUCTION YANKAUER

## (undated) DEVICE — PENCIL ELECSURG ROCKER 15FT

## (undated) DEVICE — TUBE AQUILEX OUTFLOW

## (undated) DEVICE — KIT CANNISTER AQUILEX

## (undated) DEVICE — TUBE AQUILEX INFLOW